# Patient Record
Sex: FEMALE | Race: WHITE | Employment: OTHER | URBAN - METROPOLITAN AREA
[De-identification: names, ages, dates, MRNs, and addresses within clinical notes are randomized per-mention and may not be internally consistent; named-entity substitution may affect disease eponyms.]

---

## 2017-07-06 PROCEDURE — 87086 URINE CULTURE/COLONY COUNT: CPT | Performed by: FAMILY MEDICINE

## 2017-07-06 PROCEDURE — 87186 SC STD MICRODIL/AGAR DIL: CPT | Performed by: FAMILY MEDICINE

## 2017-07-06 PROCEDURE — 87088 URINE BACTERIA CULTURE: CPT | Performed by: FAMILY MEDICINE

## 2018-09-04 ENCOUNTER — HOSPITAL ENCOUNTER (EMERGENCY)
Facility: HOSPITAL | Age: 63
Discharge: HOME | End: 2018-09-04
Attending: EMERGENCY MEDICINE
Payer: COMMERCIAL

## 2018-09-04 VITALS
WEIGHT: 110 LBS | TEMPERATURE: 99 F | HEIGHT: 60 IN | SYSTOLIC BLOOD PRESSURE: 121 MMHG | RESPIRATION RATE: 18 BRPM | DIASTOLIC BLOOD PRESSURE: 70 MMHG | OXYGEN SATURATION: 99 % | BODY MASS INDEX: 21.6 KG/M2 | HEART RATE: 65 BPM

## 2018-09-04 DIAGNOSIS — H10.33 ACUTE CONJUNCTIVITIS OF BOTH EYES, UNSPECIFIED ACUTE CONJUNCTIVITIS TYPE: Primary | ICD-10-CM

## 2018-09-04 PROCEDURE — 99283 EMERGENCY DEPT VISIT LOW MDM: CPT

## 2018-09-04 RX ORDER — CIPROFLOXACIN HYDROCHLORIDE 3.5 MG/ML
2 SOLUTION/ DROPS TOPICAL
Qty: 1 BOTTLE | Refills: 0 | Status: SHIPPED | OUTPATIENT
Start: 2018-09-04 | End: 2018-09-11

## 2018-09-04 RX ORDER — TETRACAINE HYDROCHLORIDE 5 MG/ML
SOLUTION OPHTHALMIC
Status: COMPLETED
Start: 2018-09-04 | End: 2018-09-04

## 2018-09-04 RX ORDER — TETRACAINE HYDROCHLORIDE 5 MG/ML
1 SOLUTION OPHTHALMIC ONCE
Status: COMPLETED | OUTPATIENT
Start: 2018-09-04 | End: 2018-09-04

## 2018-09-05 NOTE — ED PROVIDER NOTES
Patient Seen in: [facility] Emergency Department    History   Patient presents with: Eye Visual Problem (opthalmic)    Stated Complaint: eye infection    HPI    Plan the bilateral eye irritation and mucousy discharge since this afternoon.   She was scleral icterus. Slit lamp exam:       The left eye shows no corneal abrasion, no corneal ulcer, no foreign body, no hyphema and no hypopyon. Neck: Normal range of motion. Neck supple. Cardiovascular: Normal rate and intact distal pulses.     Pulmonar

## 2018-09-05 NOTE — ED INITIAL ASSESSMENT (HPI)
Patient arrives with c/o bilateral eye irritation and blurred vision after working in the garden and wiping her eyes. States she has had white discharge from her eyes, denies pain.

## 2018-10-15 ENCOUNTER — OFFICE VISIT (OUTPATIENT)
Dept: SURGERY | Facility: CLINIC | Age: 63
End: 2018-10-15
Payer: COMMERCIAL

## 2018-10-15 VITALS
BODY MASS INDEX: 21.6 KG/M2 | HEART RATE: 73 BPM | HEIGHT: 60 IN | WEIGHT: 110 LBS | SYSTOLIC BLOOD PRESSURE: 149 MMHG | DIASTOLIC BLOOD PRESSURE: 84 MMHG

## 2018-10-15 DIAGNOSIS — K59.01 CONSTIPATION, SLOW TRANSIT: ICD-10-CM

## 2018-10-15 DIAGNOSIS — K60.3 ANAL FISTULA: Primary | ICD-10-CM

## 2018-10-15 DIAGNOSIS — K62.89 ANAL PAIN: ICD-10-CM

## 2018-10-15 PROCEDURE — 46600 DIAGNOSTIC ANOSCOPY SPX: CPT | Performed by: COLON & RECTAL SURGERY

## 2018-10-15 PROCEDURE — 99204 OFFICE O/P NEW MOD 45 MIN: CPT | Performed by: COLON & RECTAL SURGERY

## 2018-10-15 NOTE — H&P
New Patient Visit Note       Active Problems      1. Anal fistula    2. Anal pain    3. Constipation, slow transit        Chief Complaint   Patient presents with:  Anal / Rectal Problem: New patient here for anal rectal problem.  Hx of anal fissure--had cristobal first-degree or second-degree relatives. She has no family members with colon polyps, or cancer the uterus. She is not taking any blood thinners or steroids. My total face time with this patient was 30 minutes.   Greater than half of our visit was s sister or daughter with cancer of the uterus? no   Do you have any grandparents, grandchildren, aunts, nieces, or first cousins with cancer of the uterus? no     Are you taking aspirin? no   Do you take any blood thinners?  no     Are you taking prednisone Asked    Social History Narrative      Not on file       Current Outpatient Medications:  docusate sodium 100 MG Oral Cap Take 100 mg by mouth 3 (three) times daily.  Disp:  Rfl:    Multiple Vitamins-Minerals (MULTI-DAY PLUS MINERALS) Oral Tab Take by mouth Posterior Fissure  No Pilonidal Cyst    See Procedures:  Anoscopy  Clinical exam including a anoscopy reveals her to be tender in the left lateral aspect of the anal canal.  There is a short track fistula that was easily cannulated with a probe.   It extend has no bright red blood per rectum or melena. She has no narrowing of the stool or mucus in the stool. She has no abdominal pain or  cramps. She has not suffered weight loss as a symptom.     She has not had ulcerative colitis or Crohn's disease as a d

## 2018-10-19 PROBLEM — K60.30 ANAL FISTULA: Status: ACTIVE | Noted: 2018-10-19

## 2018-10-19 PROBLEM — K60.3 ANAL FISTULA: Status: ACTIVE | Noted: 2018-10-19

## 2018-10-19 PROBLEM — K59.01 CONSTIPATION, SLOW TRANSIT: Status: ACTIVE | Noted: 2018-10-19

## 2018-10-19 PROBLEM — K62.89 ANAL PAIN: Status: ACTIVE | Noted: 2018-10-19

## 2018-10-19 RX ORDER — DOCUSATE SODIUM 100 MG/1
100 CAPSULE, LIQUID FILLED ORAL 3 TIMES DAILY
COMMUNITY
End: 2019-05-31 | Stop reason: ALTCHOICE

## 2018-10-19 RX ORDER — FIBER
TABLET ORAL DAILY
COMMUNITY
End: 2018-11-29

## 2018-10-19 NOTE — PROCEDURES
Procedure:  Anoscopy    Surgeon: Cindy Pascual    Anesthesia: None    Findings: See the progress note attached for all findings    Operative Summary: The patient was placed in a prone position on the proctoscopy table, the hips were flexed in the jackknife p

## 2018-10-19 NOTE — PATIENT INSTRUCTIONS
I am seeing this patient in consultation regarding anal and rectal pain. Patient has had multiple perianal procedures. She states in 1985 after childbirth she had an anal fissure. In [place] she underwent operation with no relief.     She had 2 proctitis. There is no current fissure in either the anterior posterior midline. Anal sphincter tone is 3/4 basal, and 3/4 maximum squeeze pressure. There is a moderate anterior rectocele. There is no significant pruritus ani.   The external fistulous o

## 2018-10-24 ENCOUNTER — TELEPHONE (OUTPATIENT)
Dept: SURGERY | Facility: CLINIC | Age: 63
End: 2018-10-24

## 2018-10-31 ENCOUNTER — TELEPHONE (OUTPATIENT)
Dept: SURGERY | Facility: CLINIC | Age: 63
End: 2018-10-31

## 2018-10-31 DIAGNOSIS — K60.3 ANAL FISTULA: Primary | ICD-10-CM

## 2018-10-31 NOTE — TELEPHONE ENCOUNTER
Pt asking about surgery coming up on Sat. All questions answered and patient verbalized understanding.

## 2018-11-03 ENCOUNTER — HOSPITAL ENCOUNTER (OUTPATIENT)
Facility: HOSPITAL | Age: 63
Setting detail: HOSPITAL OUTPATIENT SURGERY
Discharge: HOME | End: 2018-11-03
Attending: COLON & RECTAL SURGERY | Admitting: COLON & RECTAL SURGERY
Payer: COMMERCIAL

## 2018-11-03 ENCOUNTER — ANESTHESIA EVENT (OUTPATIENT)
Dept: SURGERY | Facility: HOSPITAL | Age: 63
End: 2018-11-03

## 2018-11-03 ENCOUNTER — ANESTHESIA (OUTPATIENT)
Dept: SURGERY | Facility: HOSPITAL | Age: 63
End: 2018-11-03

## 2018-11-03 VITALS
OXYGEN SATURATION: 100 % | TEMPERATURE: 98 F | BODY MASS INDEX: 22.95 KG/M2 | WEIGHT: 116.88 LBS | RESPIRATION RATE: 18 BRPM | DIASTOLIC BLOOD PRESSURE: 72 MMHG | HEIGHT: 60 IN | SYSTOLIC BLOOD PRESSURE: 117 MMHG | HEART RATE: 72 BPM

## 2018-11-03 DIAGNOSIS — K60.3 ANAL FISTULA: ICD-10-CM

## 2018-11-03 PROCEDURE — 0DBQ3ZZ EXCISION OF ANUS, PERCUTANEOUS APPROACH: ICD-10-PCS | Performed by: COLON & RECTAL SURGERY

## 2018-11-03 RX ORDER — BUPIVACAINE HYDROCHLORIDE AND EPINEPHRINE 5; 5 MG/ML; UG/ML
INJECTION, SOLUTION EPIDURAL; INTRACAUDAL; PERINEURAL AS NEEDED
Status: DISCONTINUED | OUTPATIENT
Start: 2018-11-03 | End: 2018-11-03 | Stop reason: HOSPADM

## 2018-11-03 RX ORDER — ONDANSETRON 2 MG/ML
4 INJECTION INTRAMUSCULAR; INTRAVENOUS AS NEEDED
Status: DISCONTINUED | OUTPATIENT
Start: 2018-11-03 | End: 2018-11-03

## 2018-11-03 RX ORDER — ACETAMINOPHEN 500 MG
1000 TABLET ORAL EVERY 6 HOURS PRN
Status: ON HOLD | COMMUNITY
End: 2018-11-03

## 2018-11-03 RX ORDER — HEPARIN SODIUM 5000 [USP'U]/ML
INJECTION, SOLUTION INTRAVENOUS; SUBCUTANEOUS
Status: DISCONTINUED
Start: 2018-11-03 | End: 2018-11-03

## 2018-11-03 RX ORDER — HYDROCODONE BITARTRATE AND ACETAMINOPHEN 5; 325 MG/1; MG/1
1 TABLET ORAL AS NEEDED
Status: DISCONTINUED | OUTPATIENT
Start: 2018-11-03 | End: 2018-11-03

## 2018-11-03 RX ORDER — HYDROCODONE BITARTRATE AND ACETAMINOPHEN 5; 325 MG/1; MG/1
2 TABLET ORAL AS NEEDED
Status: DISCONTINUED | OUTPATIENT
Start: 2018-11-03 | End: 2018-11-03

## 2018-11-03 RX ORDER — SODIUM CHLORIDE, SODIUM LACTATE, POTASSIUM CHLORIDE, CALCIUM CHLORIDE 600; 310; 30; 20 MG/100ML; MG/100ML; MG/100ML; MG/100ML
INJECTION, SOLUTION INTRAVENOUS CONTINUOUS
Status: DISCONTINUED | OUTPATIENT
Start: 2018-11-03 | End: 2018-11-03

## 2018-11-03 RX ORDER — MIDAZOLAM HYDROCHLORIDE 1 MG/ML
1 INJECTION INTRAMUSCULAR; INTRAVENOUS EVERY 5 MIN PRN
Status: DISCONTINUED | OUTPATIENT
Start: 2018-11-03 | End: 2018-11-03

## 2018-11-03 RX ORDER — METOCLOPRAMIDE 10 MG/1
10 TABLET ORAL EVERY 6 HOURS PRN
Qty: 12 TABLET | Refills: 0 | Status: SHIPPED | OUTPATIENT
Start: 2018-11-03 | End: 2018-11-29

## 2018-11-03 RX ORDER — HYDROCODONE BITARTRATE AND ACETAMINOPHEN 5; 325 MG/1; MG/1
1-2 TABLET ORAL EVERY 4 HOURS PRN
Qty: 15 TABLET | Refills: 0 | Status: SHIPPED | OUTPATIENT
Start: 2018-11-03 | End: 2018-11-29

## 2018-11-03 RX ORDER — HEPARIN SODIUM 5000 [USP'U]/ML
5000 INJECTION, SOLUTION INTRAVENOUS; SUBCUTANEOUS ONCE
Status: COMPLETED | OUTPATIENT
Start: 2018-11-03 | End: 2018-11-03

## 2018-11-03 RX ORDER — MORPHINE SULFATE 4 MG/ML
2 INJECTION, SOLUTION INTRAMUSCULAR; INTRAVENOUS EVERY 5 MIN PRN
Status: DISCONTINUED | OUTPATIENT
Start: 2018-11-03 | End: 2018-11-03

## 2018-11-03 RX ORDER — NALOXONE HYDROCHLORIDE 0.4 MG/ML
80 INJECTION, SOLUTION INTRAMUSCULAR; INTRAVENOUS; SUBCUTANEOUS AS NEEDED
Status: DISCONTINUED | OUTPATIENT
Start: 2018-11-03 | End: 2018-11-03

## 2018-11-03 RX ORDER — ACETAMINOPHEN 500 MG
1000 TABLET ORAL ONCE
Status: DISCONTINUED | OUTPATIENT
Start: 2018-11-03 | End: 2018-11-03 | Stop reason: HOSPADM

## 2018-11-03 NOTE — OPERATIVE REPORT
[facility]  Operative Note    Alex Adorno Location: OR   CSN [id number] MRN [id number]   Admission Date 11/3/2018 Operation Date 11/3/2018   Attending Physician Neil Campbell MD Operating Physician Jonnathan Nuñez MD     Pre-Operative Diagnosis: Kaylyn Go

## 2018-11-03 NOTE — ANESTHESIA PREPROCEDURE EVALUATION
PRE-OP EVALUATION    Patient Name: Loyda Vazquez    Pre-op Diagnosis: Anal fistula [K60.3]    Procedure(s):  COMPLEX ANAL FISTULOTOMY LEFT LATERAL    Surgeon(s) and Role:     Julio Gonzalez MD - Primary    Pre-op vitals reviewed.   Temp: 98.5 °F (36.9 Neuro/Psych    Negative neuro/psych ROS.                                 Past Surgical History:   Procedure Laterality Date   • APPENDECTOMY  1985   • COLONOSCOPY     • OTHER      tooth extracted   • OTHER SURGICAL HISTORY      sphincterotomy X 3

## 2018-11-03 NOTE — ANESTHESIA POSTPROCEDURE EVALUATION
[facility] Clarance Castle Patient Status:  Hospital Outpatient Surgery   Age/Gender 58year old female MRN [id number]   Location [address] [facility] Attending Contreras Herr MD   Hosp Day # 0 PCP None Pcp       Anesthesia Pos

## 2018-11-03 NOTE — H&P
Active Problems      1. Anal fistula    2. Anal pain    3. Constipation, slow transit          Chief Complaint   Patient presents with:  Anal / Rectal Problem: New patient here for anal rectal problem.  Hx of anal fissure--had surgery/procedure in [facility] second-degree relatives. She has no family members with colon polyps, or cancer the uterus. She is not taking any blood thinners or steroids. Allergies  Wm Latrice is allergic to zofran [ondansetron] and sulfa antibiotics.      Past Medical / Holmes Bodily Cap Take 100 mg by mouth 3 (three) times daily. Disp:  Rfl:    Multiple Vitamins-Minerals (MULTI-DAY PLUS MINERALS) Oral Tab Take by mouth daily. Disp:  Rfl:    Probiotic Product (PROBIOTIC-10 OR) Take by mouth daily.    Disp:  Rfl:    Cholecalciferol (LOREN detail. A description of the procedure and its possible outcomes was fully discussed. The patient seemed to understand the conversation and its details. Consent for surgery was confirmed with the patient.

## 2018-11-29 ENCOUNTER — OFFICE VISIT (OUTPATIENT)
Dept: SURGERY | Facility: CLINIC | Age: 63
End: 2018-11-29

## 2018-11-29 VITALS
TEMPERATURE: 98 F | BODY MASS INDEX: 21 KG/M2 | DIASTOLIC BLOOD PRESSURE: 72 MMHG | WEIGHT: 110 LBS | HEART RATE: 72 BPM | SYSTOLIC BLOOD PRESSURE: 110 MMHG

## 2018-11-29 DIAGNOSIS — K60.3 ANAL FISTULA: Primary | ICD-10-CM

## 2018-11-29 PROCEDURE — 99024 POSTOP FOLLOW-UP VISIT: CPT | Performed by: COLON & RECTAL SURGERY

## 2018-11-29 NOTE — PATIENT INSTRUCTIONS
At today's office visit the patient returns for further care and treatment after a left lateral complex anal fistulotomy performed on November 3, 2018. She has no significant bleeding or drainage. She has no significant pain or problems at the site.

## 2018-11-29 NOTE — PROGRESS NOTES
Post Operative Visit Note       Active Problems  1. Anal fistula         Chief Complaint   Patient presents with:  Post-Op: PO COMPLEX ANAL FISTULOTOMY LEFT LATERAL 11/3. feels good, mild discomfort, denies any rectal drainage or bleeding.  stools normal, i Never      Drug use: No      Sexual activity: Yes        Partners: Male    Other Topics      Concerns:       Current Outpatient Medications:  docusate sodium 100 MG Oral Cap Take 100 mg by mouth 3 (three) times daily.  Disp:  Rfl:    Probiotic Product (PROB fistula tract. I have no further follow-up scheduled with this patient at this time. This patient can see me on an as-needed basis. This patient should return urgently for any problems or complications related to my surgical intervention.     The patie

## 2019-03-01 PROCEDURE — 87086 URINE CULTURE/COLONY COUNT: CPT | Performed by: NURSE PRACTITIONER

## 2019-05-31 ENCOUNTER — LAB ENCOUNTER (OUTPATIENT)
Dept: LAB | Facility: REFERENCE LAB | Age: 64
End: 2019-05-31
Attending: FAMILY MEDICINE
Payer: COMMERCIAL

## 2019-05-31 ENCOUNTER — APPOINTMENT (OUTPATIENT)
Dept: LAB | Age: 64
End: 2019-05-31
Attending: FAMILY MEDICINE
Payer: COMMERCIAL

## 2019-05-31 ENCOUNTER — OFFICE VISIT (OUTPATIENT)
Dept: FAMILY MEDICINE CLINIC | Facility: CLINIC | Age: 64
End: 2019-05-31
Payer: COMMERCIAL

## 2019-05-31 VITALS
RESPIRATION RATE: 17 BRPM | DIASTOLIC BLOOD PRESSURE: 78 MMHG | BODY MASS INDEX: 24.39 KG/M2 | SYSTOLIC BLOOD PRESSURE: 135 MMHG | HEIGHT: 59 IN | TEMPERATURE: 98 F | OXYGEN SATURATION: 98 % | WEIGHT: 121 LBS | HEART RATE: 78 BPM

## 2019-05-31 DIAGNOSIS — Z12.11 SCREENING FOR COLON CANCER: ICD-10-CM

## 2019-05-31 DIAGNOSIS — E01.0 THYROMEGALY: ICD-10-CM

## 2019-05-31 DIAGNOSIS — Z00.00 ROUTINE MEDICAL EXAM: Primary | ICD-10-CM

## 2019-05-31 DIAGNOSIS — Z00.00 ROUTINE MEDICAL EXAM: ICD-10-CM

## 2019-05-31 DIAGNOSIS — N95.1 MENOPAUSAL SYMPTOMS: ICD-10-CM

## 2019-05-31 DIAGNOSIS — Z12.4 SCREENING FOR MALIGNANT NEOPLASM OF CERVIX: ICD-10-CM

## 2019-05-31 DIAGNOSIS — Z12.31 SCREENING MAMMOGRAM, ENCOUNTER FOR: ICD-10-CM

## 2019-05-31 DIAGNOSIS — Z13.820 SCREENING FOR OSTEOPOROSIS: ICD-10-CM

## 2019-05-31 PROCEDURE — 82306 VITAMIN D 25 HYDROXY: CPT

## 2019-05-31 PROCEDURE — 85025 COMPLETE CBC W/AUTO DIFF WBC: CPT

## 2019-05-31 PROCEDURE — 84443 ASSAY THYROID STIM HORMONE: CPT

## 2019-05-31 PROCEDURE — 93010 ELECTROCARDIOGRAM REPORT: CPT | Performed by: FAMILY MEDICINE

## 2019-05-31 PROCEDURE — 80053 COMPREHEN METABOLIC PANEL: CPT

## 2019-05-31 PROCEDURE — 80061 LIPID PANEL: CPT

## 2019-05-31 PROCEDURE — 93005 ELECTROCARDIOGRAM TRACING: CPT

## 2019-05-31 PROCEDURE — 36415 COLL VENOUS BLD VENIPUNCTURE: CPT

## 2019-05-31 PROCEDURE — 99386 PREV VISIT NEW AGE 40-64: CPT | Performed by: FAMILY MEDICINE

## 2019-05-31 NOTE — H&P
HPI:   Patient presents with:  Physical  [address] is a 58year old female who presents for a complete physical exam without pap/gyne exam.     Patient has new complaints of:  · Possible thyroid nodule-her daughter, a dentist, file      Years of education: Not on file      Highest education level: Not on file    Tobacco Use      Smoking status: Never Smoker      Smokeless tobacco: Never Used    Substance and Sexual Activity      Alcohol use: Yes        Frequency: Monthly or less auscultation B, no wheezing and no rales or rhonchi B   CARDIO: RRR without murmur, NL S1 S2, no S3 S4  GI: NABS, soft, nodistended, no masses, no HSM or tenderness  BREAST: no dominant or suspicious mass B, no nipple discharge or retraction B, no skin les cervix  Sa    3. Screening mammogram, encounter for  Sa  - CASSI MADELINE 2D+3D SCREENING BILAT (CPT=77067/42613); Future    4. Screening for osteoporosis  Sa  - XR DEXA BONE DENSITOMETRY (CPT=77080); Future  - VITAMIN D, 25-HYDROXY; Future    5.  Screening for c

## 2019-06-04 ENCOUNTER — HOSPITAL ENCOUNTER (OUTPATIENT)
Dept: ULTRASOUND IMAGING | Age: 64
Discharge: HOME | End: 2019-06-04
Attending: FAMILY MEDICINE
Payer: COMMERCIAL

## 2019-06-04 ENCOUNTER — TELEPHONE (OUTPATIENT)
Dept: FAMILY MEDICINE CLINIC | Facility: CLINIC | Age: 64
End: 2019-06-04

## 2019-06-04 DIAGNOSIS — E01.0 THYROMEGALY: ICD-10-CM

## 2019-06-04 DIAGNOSIS — D50.8 OTHER IRON DEFICIENCY ANEMIA: Primary | ICD-10-CM

## 2019-06-04 PROCEDURE — 76536 US EXAM OF HEAD AND NECK: CPT | Performed by: FAMILY MEDICINE

## 2019-06-04 NOTE — TELEPHONE ENCOUNTER
Her friends  Dr Declan Lin in [address]  has offered to look at her ekg and then refer her to a cardiologist around [place]. Advised her to make sure with her ins that doctor will be covered. She is coming in to pu a copy of ekg.

## 2019-06-06 ENCOUNTER — PATIENT MESSAGE (OUTPATIENT)
Dept: FAMILY MEDICINE CLINIC | Facility: CLINIC | Age: 64
End: 2019-06-06

## 2019-06-18 PROCEDURE — 86800 THYROGLOBULIN ANTIBODY: CPT | Performed by: OTOLARYNGOLOGY

## 2019-06-18 PROCEDURE — 36415 COLL VENOUS BLD VENIPUNCTURE: CPT | Performed by: OTOLARYNGOLOGY

## 2019-06-18 PROCEDURE — 86376 MICROSOMAL ANTIBODY EACH: CPT | Performed by: OTOLARYNGOLOGY

## 2019-07-01 ENCOUNTER — HOSPITAL ENCOUNTER (OUTPATIENT)
Dept: ULTRASOUND IMAGING | Facility: HOSPITAL | Age: 64
Discharge: HOME | End: 2019-07-01
Attending: OTOLARYNGOLOGY
Payer: COMMERCIAL

## 2019-07-01 DIAGNOSIS — E07.9 THYROID DYSFUNCTION: ICD-10-CM

## 2019-07-01 DIAGNOSIS — E04.1 THYROID NODULE: ICD-10-CM

## 2019-07-01 PROCEDURE — 10005 FNA BX W/US GDN 1ST LES: CPT | Performed by: OTOLARYNGOLOGY

## 2019-07-01 PROCEDURE — 88173 CYTOPATH EVAL FNA REPORT: CPT | Performed by: OTOLARYNGOLOGY

## 2019-07-09 ENCOUNTER — TELEPHONE (OUTPATIENT)
Dept: FAMILY MEDICINE CLINIC | Facility: CLINIC | Age: 64
End: 2019-07-09

## 2019-07-09 ENCOUNTER — OFFICE VISIT (OUTPATIENT)
Dept: FAMILY MEDICINE CLINIC | Facility: CLINIC | Age: 64
End: 2019-07-09
Payer: COMMERCIAL

## 2019-07-09 VITALS
BODY MASS INDEX: 24.39 KG/M2 | OXYGEN SATURATION: 98 % | RESPIRATION RATE: 17 BRPM | WEIGHT: 121 LBS | HEART RATE: 78 BPM | DIASTOLIC BLOOD PRESSURE: 70 MMHG | HEIGHT: 59 IN | SYSTOLIC BLOOD PRESSURE: 100 MMHG

## 2019-07-09 DIAGNOSIS — Z12.11 SCREENING FOR COLON CANCER: ICD-10-CM

## 2019-07-09 DIAGNOSIS — Z01.419 WELL WOMAN EXAM WITH ROUTINE GYNECOLOGICAL EXAM: Primary | ICD-10-CM

## 2019-07-09 DIAGNOSIS — Z12.4 SCREENING FOR MALIGNANT NEOPLASM OF CERVIX: ICD-10-CM

## 2019-07-09 PROCEDURE — 99214 OFFICE O/P EST MOD 30 MIN: CPT | Performed by: FAMILY MEDICINE

## 2019-07-09 PROCEDURE — 88175 CYTOPATH C/V AUTO FLUID REDO: CPT | Performed by: FAMILY MEDICINE

## 2019-07-09 PROCEDURE — 87624 HPV HI-RISK TYP POOLED RSLT: CPT | Performed by: FAMILY MEDICINE

## 2019-07-09 NOTE — H&P
HPI:   Patient presents with:  Gyn Exam: pap smear  Colon Cancer Screening      Chris Rhodes is a 58year old female.     She primarily presents for:  Routine GYN exam    Patient has additional complaints/concerns:  · Patient with also like to discuss fustulotomy        Zofran [Ondansetron]    OTHER (SEE COMMENTS)    Comment:Lethargic; tachycardia  Sulfa Antibiotics       RASH   Social History:  Social History    Tobacco Use      Smoking status: Never Smoker      Smokeless tobacco: Never Used    Alcohol female who presents primarily presents for:     Additional Assessment and Plan:  1.  Well woman exam with routine gynecological exam  Patient is in good health, exam within normal limits today, ThinPrep with HPV performed today, recommend bilateral screenin

## 2019-07-10 LAB — HPV I/H RISK 1 DNA SPEC QL NAA+PROBE: NEGATIVE

## 2019-09-11 PROBLEM — R94.31 ABNORMAL EKG: Status: ACTIVE | Noted: 2019-09-11

## 2019-09-19 ENCOUNTER — TELEPHONE (OUTPATIENT)
Dept: FAMILY MEDICINE CLINIC | Facility: CLINIC | Age: 64
End: 2019-09-19

## 2019-11-03 ENCOUNTER — OFFICE VISIT (OUTPATIENT)
Dept: FAMILY MEDICINE CLINIC | Facility: CLINIC | Age: 64
End: 2019-11-03
Payer: MEDICARE

## 2019-11-03 VITALS
RESPIRATION RATE: 16 BRPM | HEIGHT: 60 IN | BODY MASS INDEX: 21.99 KG/M2 | SYSTOLIC BLOOD PRESSURE: 108 MMHG | HEART RATE: 79 BPM | DIASTOLIC BLOOD PRESSURE: 70 MMHG | WEIGHT: 112 LBS | TEMPERATURE: 99 F | OXYGEN SATURATION: 99 %

## 2019-11-03 DIAGNOSIS — J01.00 ACUTE NON-RECURRENT MAXILLARY SINUSITIS: Primary | ICD-10-CM

## 2019-11-03 PROCEDURE — 99213 OFFICE O/P EST LOW 20 MIN: CPT | Performed by: NURSE PRACTITIONER

## 2019-11-03 RX ORDER — AMOXICILLIN AND CLAVULANATE POTASSIUM 875; 125 MG/1; MG/1
1 TABLET, FILM COATED ORAL 2 TIMES DAILY
Qty: 20 TABLET | Refills: 0 | Status: SHIPPED | OUTPATIENT
Start: 2019-11-03 | End: 2019-11-13

## 2019-11-03 RX ORDER — PREDNISONE 20 MG/1
40 TABLET ORAL DAILY
Qty: 10 TABLET | Refills: 0 | Status: SHIPPED | OUTPATIENT
Start: 2019-11-03 | End: 2019-11-08

## 2019-11-03 NOTE — PROGRESS NOTES
CHIEF COMPLAINT:   No chief complaint on file. HPI:   Armani Banerjee is a 71year old female who presents for cold symptoms for  3  weeks. Symptoms have progressed into sinus congestion and been worsening since onset.  Sinus congestion/pain is descri GI: denies N/V/C or abdominal pain  NEURO: + sinus headaches. No numbness or tingling in face.     EXAM:   /70   Pulse 79   Temp 98.5 °F (36.9 °C) (Oral)   Resp 16   Ht 60\"   Wt 112 lb (50.8 kg)   LMP 09/30/2004   SpO2 99%   BMI 21.87 kg/m²   GENERA The sinuses are air-filled spaces within the bones of the face. They connect to the inside of the nose. Sinusitis is an inflammation of the tissue that lines the sinuses. Sinusitis can occur during a cold.  It can also happen due to allergies to pollens and · Do not use nasal rinses or irrigation during an acute sinus infection, unless your healthcare provider tells you to. Rinsing may spread the infection to other areas in your sinuses.   · Use acetaminophen or ibuprofen to control pain, unless another pain m

## 2019-12-13 ENCOUNTER — OFFICE VISIT (OUTPATIENT)
Dept: FAMILY MEDICINE CLINIC | Facility: CLINIC | Age: 64
End: 2019-12-13
Payer: MEDICARE

## 2019-12-13 VITALS
DIASTOLIC BLOOD PRESSURE: 62 MMHG | RESPIRATION RATE: 16 BRPM | OXYGEN SATURATION: 99 % | HEIGHT: 60 IN | WEIGHT: 118.63 LBS | TEMPERATURE: 99 F | SYSTOLIC BLOOD PRESSURE: 108 MMHG | HEART RATE: 70 BPM | BODY MASS INDEX: 23.29 KG/M2

## 2019-12-13 DIAGNOSIS — J01.41 ACUTE RECURRENT PANSINUSITIS: Primary | ICD-10-CM

## 2019-12-13 PROCEDURE — 99213 OFFICE O/P EST LOW 20 MIN: CPT | Performed by: NURSE PRACTITIONER

## 2019-12-13 RX ORDER — DOXYCYCLINE HYCLATE 100 MG
100 TABLET ORAL 2 TIMES DAILY
Qty: 14 TABLET | Refills: 0 | Status: SHIPPED | OUTPATIENT
Start: 2019-12-13 | End: 2019-12-20

## 2019-12-13 NOTE — PROGRESS NOTES
CHIEF COMPLAINT:   Patient presents with:  Sinus Problem: blowing nose, headache, throat sore, post nasal drip, sinus congestion,     HPI:   Liyah Steel is a 71year old female who presents for sinus congestion for  2  weeks.  Was on antibiotics 1 boom CARDIOVASCULAR: denies chest pain or palpitations   GI: denies N/V/C or abdominal pain  NEURO: + sinus headaches. No numbness or tingling in face.     EXAM:   /62   Pulse 70   Temp 98.9 °F (37.2 °C) (Oral)   Resp 16   Ht 60\"   Wt 118 lb 9.6 oz (53.8 Risks, benefits, side effects of medication addressed and explained. Patient Instructions     Sinusitis (Antibiotic Treatment)    The sinuses are air-filled spaces within the bones of the face.  They connect to the inside of the nose. Sinusitis is an inf · Over-the-counter antihistamines may help if allergies contributed to your sinusitis.    · Do not use nasal rinses or irrigation during an acute sinus infection, unless your healthcare provider tells you to.  Rinsing may spread the infection to other areas

## 2020-02-24 ENCOUNTER — TELEPHONE (OUTPATIENT)
Dept: FAMILY MEDICINE CLINIC | Facility: CLINIC | Age: 65
End: 2020-02-24

## 2020-04-22 ENCOUNTER — TELEPHONE (OUTPATIENT)
Dept: FAMILY MEDICINE CLINIC | Facility: CLINIC | Age: 65
End: 2020-04-22

## 2020-08-26 ENCOUNTER — TELEPHONE (OUTPATIENT)
Dept: FAMILY MEDICINE CLINIC | Facility: CLINIC | Age: 65
End: 2020-08-26

## 2020-08-27 ENCOUNTER — TELEPHONE (OUTPATIENT)
Dept: FAMILY MEDICINE CLINIC | Facility: CLINIC | Age: 65
End: 2020-08-27

## 2020-10-26 ENCOUNTER — TELEPHONE (OUTPATIENT)
Dept: FAMILY MEDICINE CLINIC | Facility: CLINIC | Age: 65
End: 2020-10-26

## 2021-01-18 ENCOUNTER — TELEPHONE (OUTPATIENT)
Dept: FAMILY MEDICINE CLINIC | Facility: CLINIC | Age: 66
End: 2021-01-18

## 2021-01-28 ENCOUNTER — TELEPHONE (OUTPATIENT)
Dept: FAMILY MEDICINE CLINIC | Facility: CLINIC | Age: 66
End: 2021-01-28

## 2021-03-15 DIAGNOSIS — Z23 NEED FOR VACCINATION: ICD-10-CM

## 2021-03-16 ENCOUNTER — TELEPHONE (OUTPATIENT)
Dept: FAMILY MEDICINE CLINIC | Facility: CLINIC | Age: 66
End: 2021-03-16

## 2021-06-07 ENCOUNTER — TELEPHONE (OUTPATIENT)
Dept: FAMILY MEDICINE CLINIC | Facility: CLINIC | Age: 66
End: 2021-06-07

## 2021-07-12 ENCOUNTER — TELEPHONE (OUTPATIENT)
Dept: FAMILY MEDICINE CLINIC | Facility: CLINIC | Age: 66
End: 2021-07-12

## 2021-08-19 ENCOUNTER — TELEPHONE (OUTPATIENT)
Dept: FAMILY MEDICINE CLINIC | Facility: CLINIC | Age: 66
End: 2021-08-19

## 2021-09-02 ENCOUNTER — OFFICE VISIT (OUTPATIENT)
Dept: FAMILY MEDICINE CLINIC | Facility: CLINIC | Age: 66
End: 2021-09-02
Payer: MEDICARE

## 2021-09-02 VITALS
OXYGEN SATURATION: 100 % | WEIGHT: 120 LBS | RESPIRATION RATE: 18 BRPM | HEART RATE: 81 BPM | DIASTOLIC BLOOD PRESSURE: 50 MMHG | TEMPERATURE: 98 F | SYSTOLIC BLOOD PRESSURE: 101 MMHG | HEIGHT: 60 IN | BODY MASS INDEX: 23.56 KG/M2

## 2021-09-02 DIAGNOSIS — L03.011 PARONYCHIA OF RIGHT MIDDLE FINGER: Primary | ICD-10-CM

## 2021-09-02 PROCEDURE — 99213 OFFICE O/P EST LOW 20 MIN: CPT | Performed by: NURSE PRACTITIONER

## 2021-09-02 RX ORDER — AMOXICILLIN AND CLAVULANATE POTASSIUM 875; 125 MG/1; MG/1
1 TABLET, FILM COATED ORAL 2 TIMES DAILY
Qty: 14 TABLET | Refills: 0 | Status: SHIPPED | OUTPATIENT
Start: 2021-09-02 | End: 2021-09-09

## 2021-09-02 NOTE — PATIENT INSTRUCTIONS
Paronychia of the Finger or Toe  Paronychia is an infection near a fingernail or toenail. It usually occurs when an opening in the cuticle or an ingrown toenail lets bacteria under the skin. The infection will need to be drained if pus is present.  If t streaks in the skin leading away from the wound  · Pus or fluid draining from the nail area  · Fever of 100.4ºF (38ºC) or higher, or as directed by your provider  John last reviewed this educational content on 7/1/2019 © 2000-2021 [employer]

## 2021-09-17 ENCOUNTER — TELEPHONE (OUTPATIENT)
Dept: FAMILY MEDICINE CLINIC | Facility: CLINIC | Age: 66
End: 2021-09-17

## 2021-10-11 ENCOUNTER — TELEPHONE (OUTPATIENT)
Dept: FAMILY MEDICINE CLINIC | Facility: CLINIC | Age: 66
End: 2021-10-11

## 2021-11-05 ENCOUNTER — TELEPHONE (OUTPATIENT)
Dept: FAMILY MEDICINE CLINIC | Facility: CLINIC | Age: 66
End: 2021-11-05

## 2022-03-02 ENCOUNTER — TELEPHONE (OUTPATIENT)
Dept: FAMILY MEDICINE CLINIC | Facility: CLINIC | Age: 67
End: 2022-03-02

## 2022-03-10 ENCOUNTER — OFFICE VISIT (OUTPATIENT)
Dept: SURGERY | Facility: CLINIC | Age: 67
End: 2022-03-10
Payer: MEDICARE

## 2022-03-10 VITALS
WEIGHT: 115 LBS | HEIGHT: 60 IN | SYSTOLIC BLOOD PRESSURE: 105 MMHG | HEART RATE: 75 BPM | TEMPERATURE: 97 F | DIASTOLIC BLOOD PRESSURE: 55 MMHG | BODY MASS INDEX: 22.58 KG/M2

## 2022-03-10 DIAGNOSIS — K60.2 ANAL FISSURE: Primary | ICD-10-CM

## 2022-03-10 PROCEDURE — 99204 OFFICE O/P NEW MOD 45 MIN: CPT | Performed by: COLON & RECTAL SURGERY

## 2022-03-10 NOTE — PATIENT INSTRUCTIONS
The patient presents for evaluation of anal pain. The patient states since December 2021, she has been having increased anal pain with bowel movements. She states her pain is right-sided and similar to her pain with anal fissures in the past.  The patient has tried to increase fiber in her diet to soften her stools, but continues to have issues with anal fissures. She has a history of constipation for which she occasionally takes Colace. She has been taking sitz bath's, which provides some relief. 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 She states she has used nitroglycerin in the past with minimal relief. She states she has headaches as a side effect when using nitroglycerin. She has also had 3 lateral internal sphincterotomies to treat her anal fissures. I last saw this patient in 2018, when I performed a left lateral complex anal fistulotomy. It was performed on November, 13, 2018. She states she has not had issues since but wants to make sure her current anal pain is not another anal fistula. Her last colonoscopy was approximately 20 years ago. She does not want to schedule one at this time. The patient has no significant past medical history. She is not taking a blood thinner. The patient has had an appendectomy, left lateral complex anal fistulotomy and 3 lateral internal sphincterotomies. She has had no other abdominal operations. Patient denies any first or second-degree relatives with a history of colon cancer. The patient denies any first or second-degree relatives with a history of uterine cancer. External rectal exam revealed a posterior midline and right posterior anal fissure. No fistulae, external hemorrhoids or abscesses. Digital rectal exam revealed no palpable masses a moderate rectocele. Basal tone 2/4 and maximum squeeze pressure 4/4.     I will give the patient nitroglycerin ointment to apply on her fissures 3 times daily. She was instructed to apply the ointment externally and to only apply a small amount, so she does not get headaches. The patient will follow up with me in 3 weeks.

## 2022-05-27 ENCOUNTER — TELEPHONE (OUTPATIENT)
Dept: FAMILY MEDICINE CLINIC | Facility: CLINIC | Age: 67
End: 2022-05-27

## 2022-11-03 ENCOUNTER — HOSPITAL ENCOUNTER (OUTPATIENT)
Dept: ULTRASOUND IMAGING | Age: 67
Discharge: HOME | End: 2022-11-03
Attending: INTERNAL MEDICINE
Payer: MEDICARE

## 2022-11-03 DIAGNOSIS — E07.9 DISEASE OF THYROID GLAND: ICD-10-CM

## 2022-11-03 PROCEDURE — 76536 US EXAM OF HEAD AND NECK: CPT | Performed by: INTERNAL MEDICINE

## 2023-03-07 ENCOUNTER — PATIENT MESSAGE (OUTPATIENT)
Dept: FAMILY MEDICINE CLINIC | Facility: CLINIC | Age: 68
End: 2023-03-07

## 2023-06-29 ENCOUNTER — TELEPHONE (OUTPATIENT)
Dept: FAMILY MEDICINE CLINIC | Facility: CLINIC | Age: 68
End: 2023-06-29

## 2023-08-14 ENCOUNTER — TELEPHONE (OUTPATIENT)
Dept: FAMILY MEDICINE CLINIC | Facility: CLINIC | Age: 68
End: 2023-08-14

## 2023-10-04 ENCOUNTER — PATIENT OUTREACH (OUTPATIENT)
Dept: CASE MANAGEMENT | Age: 68
End: 2023-10-04

## 2023-10-04 NOTE — PROCEDURES
The office order for PCP removal request is Approved and finalized on October 4, 2023.     Thanks,  [facility] [employer]

## 2023-11-10 ENCOUNTER — TELEPHONE (OUTPATIENT)
Dept: FAMILY MEDICINE CLINIC | Facility: CLINIC | Age: 68
End: 2023-11-10

## 2024-12-06 ENCOUNTER — OFFICE VISIT (OUTPATIENT)
Dept: FAMILY MEDICINE CLINIC | Facility: CLINIC | Age: 69
End: 2024-12-06
Payer: MEDICARE

## 2024-12-06 VITALS
RESPIRATION RATE: 18 BRPM | HEIGHT: 60 IN | BODY MASS INDEX: 22.58 KG/M2 | WEIGHT: 115 LBS | OXYGEN SATURATION: 100 % | HEART RATE: 69 BPM | SYSTOLIC BLOOD PRESSURE: 112 MMHG | TEMPERATURE: 99 F | DIASTOLIC BLOOD PRESSURE: 63 MMHG

## 2024-12-06 DIAGNOSIS — J02.9 SORE THROAT: ICD-10-CM

## 2024-12-06 DIAGNOSIS — J02.0 STREP PHARYNGITIS: Primary | ICD-10-CM

## 2024-12-06 LAB
CONTROL LINE PRESENT WITH A CLEAR BACKGROUND (YES/NO): YES YES/NO
KIT LOT #: NORMAL NUMERIC
STREP GRP A CUL-SCR: POSITIVE

## 2024-12-06 PROCEDURE — 99213 OFFICE O/P EST LOW 20 MIN: CPT | Performed by: FAMILY MEDICINE

## 2024-12-06 PROCEDURE — 87880 STREP A ASSAY W/OPTIC: CPT | Performed by: FAMILY MEDICINE

## 2024-12-06 RX ORDER — CEFDINIR 300 MG/1
300 CAPSULE ORAL 2 TIMES DAILY
Qty: 20 CAPSULE | Refills: 0 | Status: SHIPPED | OUTPATIENT
Start: 2024-12-06

## 2024-12-06 RX ORDER — DEXAMETHASONE 2 MG/1
10 TABLET ORAL ONCE
Qty: 5 TABLET | Refills: 0 | Status: SHIPPED | OUTPATIENT
Start: 2024-12-06 | End: 2024-12-06

## 2024-12-06 NOTE — PROGRESS NOTES
CHIEF COMPLAINT:     Chief Complaint   Patient presents with    Cold     Swollen glands, sore throat and congestion. Started on Monday   OTC:Tylenol  NO exposure        HPI:   Rosa Bull is a 69 year old female presents to clinic with symptoms of sore throat that presented over 1 week ago.  Symptoms have worsening since onset.  Patient reports congestion, body aches, headache, significant lymph node swelling, Denies  cough, fever, stomach upset, rash. History of strep?: none Sick contacts: many grandchildren around for thanksgiving last week-- but none of them with known strep.  Treating symptoms with: tylenol    Current Outpatient Medications   Medication Sig Dispense Refill    cefdinir 300 MG Oral Cap Take 1 capsule (300 mg total) by mouth 2 (two) times daily. 20 capsule 0    dexamethasone 2 MG Oral Tab Take 5 tablets (10 mg total) by mouth one time for 1 dose. 5 tablet 0    Probiotic Product (PROBIOTIC-10 OR) Take by mouth daily.        Cholecalciferol (VITAMIN D-3) 1000 UNITS Oral Cap Take by mouth daily.         No current facility-administered medications for this visit.      Past Medical History:    Anal fissure    MENOPAUSE    Visual impairment    contacts and glasses      Social History:  Social History     Socioeconomic History    Marital status:    Tobacco Use    Smoking status: Never    Smokeless tobacco: Never   Vaping Use    Vaping status: Never Used   Substance and Sexual Activity    Alcohol use: Yes    Drug use: No    Sexual activity: Yes     Partners: Male        REVIEW OF SYSTEMS:   GENERAL HEALTH:  See HPI  SKIN: denies any unusual skin lesions or rashes  HEENT: denies ear pain, See HPI  RESPIRATORY: denies shortness of breath, or wheezing  CARDIOVASCULAR: denies chest pain, palpitations   GI: denies abdominal pain, constipation and diarrhea  NEURO: denies dizziness or lightheadedness    EXAM:   /63   Pulse 69   Temp 99.1 °F (37.3 °C) (Temporal)   Resp 18   Ht 5' (1.524 m)    Wt 115 lb (52.2 kg)   LMP 09/30/2004   SpO2 100%   BMI 22.46 kg/m²   GENERAL: well developed, well nourished,in no apparent distress  SKIN: no rashes,no suspicious lesions  HEAD: atraumatic, normocephalic  EYES: conjunctivae clear, EOM intact  EARS: TM's clear, non-injected, no bulging, retraction, or fluid  NOSE: nostrils patent, no exudates, nasal mucosa pink and noninflamed  THROAT: oral mucosa pink, moist. Posterior pharynx erythematous and injected. No visible exudates. Tonsils 2/4.  Breath is malodorous   NECK: supple, non-tender  LUNGS: clear to auscultation bilaterally, no wheezes or rhonchi. Breathing is non labored.  CARDIO: RRR without murmur  EXTREMITIES: no cyanosis, clubbing or edema  LYMPH: ++ Markedly enlarged anterior cervical and submandibular lymphadenopathy.  No posterior cervical or occipital lymphadenopathy.    Recent Results (from the past 24 hours)   Strep A Assay W/Optic    Collection Time: 12/06/24 10:07 AM   Result Value Ref Range    Strep Grp A Screen Positive Negative    Control Line Present with a clear background (yes/no) yes Yes/No    Kit Lot # [id number] Numeric    Kit Expiration Date 11/4/25 Date       ASSESSMENT AND PLAN:     Encounter Diagnoses   Name Primary?    Strep pharyngitis Yes    Sore throat        Orders Placed This Encounter   Procedures    Strep A Assay W/Optic       Meds & Refills for this Visit:  Requested Prescriptions     Signed Prescriptions Disp Refills    cefdinir 300 MG Oral Cap 20 capsule 0     Sig: Take 1 capsule (300 mg total) by mouth 2 (two) times daily.    dexamethasone 2 MG Oral Tab 5 tablet 0     Sig: Take 5 tablets (10 mg total) by mouth one time for 1 dose.       Imaging & Consults:  None      Risks, benefits, complications and side effects of meds discussed with patient.     OTC Tylenol/Motrin prn. Push fluids- warm or cool liquids, whichever is soothing for patient  Change tooth brush two days into therapy. Warm salt water gargles 2 times per day  for at least 3 days.  Do not share utensils or drinks with anyone.    Follow up in 3-5 days if not improving, condition worsens, or fever greater than or equal to 100.4 persists for 72 hours.      Patient Instructions   Take antibiotics with food and plenty of water.   Eat yogurt or take probiotic daily. (Probiotic-10 by [employer] is a good example of an OTC probiotic)  Make sure to finish the entire antibiotic treatment.  Take dexamethasone-- 2mg x 5-- all at once.  Take with food.  Increase fluids and rest.   Use tylenol as needed for pain.   Warm saltwater gargles can help with throat pain as well.   Apply warm compresses to the sides of your neck to relieve lymph congestion and pain.   Monitor symptoms and contact the office if no better in 2-3 days.  Notify close contacts of the strep exposure as soon as possible.     The patient/parent indicates understanding of these issues and agrees to the plan.  The patient is asked to follow up with their PCP prn.

## 2024-12-06 NOTE — PATIENT INSTRUCTIONS
Take antibiotics with food and plenty of water.   Eat yogurt or take probiotic daily. (Probiotic-10 by [employer]'s Michela is a good example of an OTC probiotic)  Make sure to finish the entire antibiotic treatment.  Take dexamethasone-- 2mg x 5-- all at once.  Take with food.  Increase fluids and rest.   Use tylenol as needed for pain.   Warm saltwater gargles can help with throat pain as well.   Apply warm compresses to the sides of your neck to relieve lymph congestion and pain.   Monitor symptoms and contact the office if no better in 2-3 days.

## 2024-12-09 ENCOUNTER — OFFICE VISIT (OUTPATIENT)
Dept: FAMILY MEDICINE CLINIC | Facility: CLINIC | Age: 69
End: 2024-12-09
Payer: MEDICARE

## 2024-12-09 VITALS
DIASTOLIC BLOOD PRESSURE: 78 MMHG | WEIGHT: 119 LBS | BODY MASS INDEX: 23.36 KG/M2 | HEART RATE: 92 BPM | RESPIRATION RATE: 16 BRPM | TEMPERATURE: 99 F | HEIGHT: 60 IN | SYSTOLIC BLOOD PRESSURE: 134 MMHG | OXYGEN SATURATION: 98 %

## 2024-12-09 DIAGNOSIS — J02.0 STREP PHARYNGITIS: ICD-10-CM

## 2024-12-09 DIAGNOSIS — R59.1 LYMPHADENOPATHY: Primary | ICD-10-CM

## 2024-12-09 PROCEDURE — 99213 OFFICE O/P EST LOW 20 MIN: CPT | Performed by: FAMILY MEDICINE

## 2024-12-09 RX ORDER — DEXAMETHASONE 2 MG/1
TABLET ORAL
COMMUNITY
Start: 2024-12-06

## 2024-12-09 RX ORDER — PREDNISONE 20 MG/1
TABLET ORAL
Qty: 10 TABLET | Refills: 0 | Status: SHIPPED | OUTPATIENT
Start: 2024-12-09

## 2024-12-10 NOTE — PATIENT INSTRUCTIONS
Take prednisone-- 2 tabs once daily for 5 days. Take with food.   While you are on steroids it is preferred that you take Tylenol for pain if needed rather than ibuprofen (Motrin/Ibuprofen) or Aleve.  Continue antibiotics for strep infection.   Use warm compresses to areas of pain and swelling to help with lymphatic flow.   Increase fluid and rest.   If symptoms increase or persist, please contact your PCP or go to the ER for urgent evaluation.

## 2024-12-10 NOTE — PROGRESS NOTES
CHIEF COMPLAINT:     Chief Complaint   Patient presents with    Sore Throat     Back pain and swollen glands x 2 days, back of head hurts        HPI:   Rosa Bull is a 69 year old female presents to clinic for re-evaluation of enlarged lymph nodes that have been present since her last visit on 12/6. Rosa was treated for strep pharyngitis of approximately 8-10 days duration. There was significant swelling and pain of the submandibular nodes.  She was treated with cefdinir and dexamethasone 10mg stat dose.   Rosa is here today due to continued pain and swelling of her lymph nodes. She states that the steroid treatment did improve her symptoms, but they returned after the steroids wore off.  She has difficulty swallowing, moving her jaw or head. She complains of new pain in her posterior neck and back.  She denies fever, chills, body aches.      Current Outpatient Medications   Medication Sig Dispense Refill    dexamethasone 2 MG Oral Tab Take 5 tablets (10 mg total) by mouth one time for 1 dose.      predniSONE 20 MG Oral Tab TAKE 2 TABS BY MOUTH DAILY FOR 5 DAYS 10 tablet 0    cefdinir 300 MG Oral Cap Take 1 capsule (300 mg total) by mouth 2 (two) times daily. 20 capsule 0    Probiotic Product (PROBIOTIC-10 OR) Take by mouth daily.        Cholecalciferol (VITAMIN D-3) 1000 UNITS Oral Cap Take by mouth daily.         No current facility-administered medications for this visit.      Past Medical History:    Anal fissure    MENOPAUSE    Visual impairment    contacts and glasses      Social History:  Social History     Socioeconomic History    Marital status:    Tobacco Use    Smoking status: Never    Smokeless tobacco: Never   Vaping Use    Vaping status: Never Used   Substance and Sexual Activity    Alcohol use: Yes    Drug use: No    Sexual activity: Yes     Partners: Male        REVIEW OF SYSTEMS:   GENERAL HEALTH:  See HPI  SKIN: denies any unusual skin lesions or rashes  HEENT: denies ear pain,  See HPI  RESPIRATORY: denies shortness of breath, or wheezing  CARDIOVASCULAR: denies chest pain, palpitations   GI: denies abdominal pain, constipation and diarrhea  NEURO: denies dizziness or lightheadedness    EXAM:   /78   Pulse 92   Temp 98.9 °F (37.2 °C)   Resp 16   Ht 5' (1.524 m)   Wt 119 lb (54 kg)   LMP 09/30/2004   SpO2 98%   BMI 23.24 kg/m²   GENERAL: well developed, well nourished. Pt in mild distress due to pain.   SKIN: no rashes,no suspicious lesions  HEAD: atraumatic, normocephalic  EYES: conjunctivae clear, EOM intact  EARS: TM's clear, non-injected, no bulging, retraction, or fluid  NOSE: nostrils patent, no exudates, nasal mucosa pink and noninflamed  THROAT: oral mucosa pink, moist. Posterior pharynx without erythema. no exudates. Tonsils 2/4.  Breath is malodorous   The throat is improved in appearance. There is less erythema and pt is able to open her mouth to a greater degree than at the previous visit.   NECK: supple, non-tender  LUNGS: clear to auscultation bilaterally, no wheezes or rhonchi. Breathing is non labored.  CARDIO: RRR without murmur  GI:Normoactive BS x4, no masses, HSM, or tenderness on direct palpation  EXTREMITIES: no cyanosis, clubbing or edema  LYMPH: + Positive anterior cervical and submandibular lymphadenopathy. R>L, There is significant improvement in the left side of the AC and submandibular enlargement. The right side remains enlarged, but is also improved somewhat.  Both sides are less tender and pt allows an exam with light to moderate palpation today, which is significantly improved from the visit on 12/6, during which the patient could not tolerated light touch to the area without the pressure of palpation.  Pt has several small posterior cervical nodes, R>L.  There is no occipital lymphadenopathy.     No results found for this or any previous visit (from the past 24 hours).    ASSESSMENT AND PLAN:     Encounter Diagnoses   Name Primary?     Lymphadenopathy Yes    Strep pharyngitis        No orders of the defined types were placed in this encounter.      Meds & Refills for this Visit:  Requested Prescriptions     Signed Prescriptions Disp Refills    predniSONE 20 MG Oral Tab 10 tablet 0     Sig: TAKE 2 TABS BY MOUTH DAILY FOR 5 DAYS       Imaging & Consults:  None      Risks, benefits, complications and side effects of meds discussed with patient.     OTC Tylenol/Motrin prn. Push fluids- warm or cool liquids, whichever is soothing for patient  Change tooth brush two days into therapy. Warm salt water gargles 2 times per day for at least 3 days.  Do not share utensils or drinks with anyone.    Follow up in 3-5 days if not improving, condition worsens, or fever greater than or equal to 100.4 persists for 72 hours.      Patient Instructions   Take prednisone-- 2 tabs once daily for 5 days. Take with food.   While you are on steroids it is preferred that you take Tylenol for pain if needed rather than ibuprofen (Motrin/Ibuprofen) or Aleve.  Continue antibiotics for strep infection.   Use warm compresses to areas of pain and swelling to help with lymphatic flow.   Increase fluid and rest.   If symptoms increase or persist, please contact your PCP or go to the ER for urgent evaluation.   The patient/parent indicates understanding of these issues and agrees to the plan.  The patient is asked to follow up with their PCP prn.

## (undated) DEVICE — SUTURE VICRYL 2-0 UR-6

## (undated) DEVICE — GAUZE SPONGES,USP TYPE VII GAUZE, 12 PLY: Brand: CURITY

## (undated) DEVICE — BRIEF INCONTINENCE ADULT 2XL

## (undated) DEVICE — KENDALL SCD EXPRESS SLEEVES, KNEE LENGTH, MEDIUM: Brand: KENDALL SCD

## (undated) DEVICE — STERILE POLYISOPRENE POWDER-FREE SURGICAL GLOVES: Brand: PROTEXIS

## (undated) DEVICE — SOL  .9 1000ML BTL

## (undated) DEVICE — RECTAL CDS-LF: Brand: MEDLINE INDUSTRIES, INC.

## (undated) NOTE — LETTER
03/10/22    Patient: Shadia Sanchez  : 1955 Visit date: 3/10/2022    Dear  Lynette Zepeda MD    Thank you for referring Shadia Sanchez to my practice. Please find my assessment and plan below. Assessment   Anal fissure, posterior midline and right posterior  (primary encounter diagnosis)    Plan   The patient presents for evaluation of anal pain. The patient states since 2021, she has been having increased anal pain with bowel movements. She states her pain is right-sided and similar to her pain with anal fissures in the past.  The patient has tried to increase fiber in her diet to soften her stools, but continues to have issues with anal fissures. She has a history of constipation for which she occasionally takes Colace. She has been taking sitz bath's, which provides some relief. 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 She states she has used nitroglycerin in the past with minimal relief. She states she has headaches as a side effect when using nitroglycerin. She has also had 3 lateral internal sphincterotomies to treat her anal fissures. I last saw this patient in 2018, when I performed a left lateral complex anal fistulotomy. It was performed on . She states she has not had issues since but wants to make sure her current anal pain is not another anal fistula. Her last colonoscopy was approximately 20 years ago. She does not want to schedule one at this time. The patient has no significant past medical history. The patient has had an appendectomy, left lateral complex anal fistulotomy and 3 lateral internal sphincterotomies. She has had no other abdominal operations. Patient denies any first or second-degree relatives with a history of colon cancer. The patient denies any first or second-degree relatives with a history of uterine cancer.     External rectal exam revealed a posterior midline and right posterior anal fissure. No fistulae, external hemorrhoids or abscesses. Digital rectal exam revealed no palpable masses a moderate rectocele. Basal tone 2/4 and maximum squeeze pressure 4/4. I will give the patient nitroglycerin ointment to apply on her fissures 3 times daily. She was instructed to apply the ointment externally and to only apply a small amount, so she does not get headaches. The patient will follow up with me in 3 weeks.      Sincerely,   Wilman Aguilar MD

## (undated) NOTE — ED AVS SNAPSHOT
Lars Nick   MRN: [id number]    Department:  [facility] Emergency Department   Date of Visit:  9/4/2018           Disclosure     Insurance plans vary and the physician(s) referred by the ER may not be covered by your plan.  Please contact you tell this physician (or your personal doctor if your instructions are to return to your personal doctor) about any new or lasting problems. The primary care or specialist physician will see patients referred from the [facility] Emergency Department.  Severo Pastures